# Patient Record
Sex: MALE | Race: WHITE | NOT HISPANIC OR LATINO | Employment: FULL TIME | URBAN - METROPOLITAN AREA
[De-identification: names, ages, dates, MRNs, and addresses within clinical notes are randomized per-mention and may not be internally consistent; named-entity substitution may affect disease eponyms.]

---

## 2017-04-24 ENCOUNTER — COMMUNICATION - HEALTHEAST (OUTPATIENT)
Dept: TELEHEALTH | Facility: CLINIC | Age: 22
End: 2017-04-24

## 2017-04-24 ENCOUNTER — OFFICE VISIT - HEALTHEAST (OUTPATIENT)
Dept: FAMILY MEDICINE | Facility: CLINIC | Age: 22
End: 2017-04-24

## 2017-04-24 DIAGNOSIS — F90.9 ADHD (ATTENTION DEFICIT HYPERACTIVITY DISORDER): ICD-10-CM

## 2017-04-24 ASSESSMENT — MIFFLIN-ST. JEOR: SCORE: 1914.78

## 2017-07-25 ENCOUNTER — COMMUNICATION - HEALTHEAST (OUTPATIENT)
Dept: FAMILY MEDICINE | Facility: CLINIC | Age: 22
End: 2017-07-25

## 2017-07-25 DIAGNOSIS — F90.9 ADHD (ATTENTION DEFICIT HYPERACTIVITY DISORDER): ICD-10-CM

## 2018-07-19 ENCOUNTER — OFFICE VISIT - HEALTHEAST (OUTPATIENT)
Dept: FAMILY MEDICINE | Facility: CLINIC | Age: 23
End: 2018-07-19

## 2018-07-19 ENCOUNTER — RECORDS - HEALTHEAST (OUTPATIENT)
Dept: GENERAL RADIOLOGY | Facility: CLINIC | Age: 23
End: 2018-07-19

## 2018-07-19 ENCOUNTER — COMMUNICATION - HEALTHEAST (OUTPATIENT)
Dept: TELEHEALTH | Facility: CLINIC | Age: 23
End: 2018-07-19
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
## 2018-07-19 ASSESSMENT — MIFFLIN-ST. JEOR: SCORE: 1946.54

## 2018-07-20 ENCOUNTER — HOSPITAL ENCOUNTER (OUTPATIENT)
Dept: CT IMAGING | Facility: CLINIC | Age: 23
Discharge: HOME | End: 2018-07-20
Attending: FAMILY MEDICINE
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
## 2018-09-19 ENCOUNTER — OFFICE VISIT - HEALTHEAST (OUTPATIENT)
Dept: FAMILY MEDICINE | Facility: CLINIC | Age: 23
End: 2018-09-19

## 2018-09-19 DIAGNOSIS — F90.9 ADHD (ATTENTION DEFICIT HYPERACTIVITY DISORDER): ICD-10-CM

## 2018-09-19 DIAGNOSIS — Z79.899 CONTROLLED SUBSTANCE AGREEMENT SIGNED: ICD-10-CM

## 2019-01-26 ENCOUNTER — RECORDS - HEALTHEAST (OUTPATIENT)
Dept: ADMINISTRATIVE | Facility: OTHER | Age: 24
End: 2019-01-26

## 2019-04-17 ENCOUNTER — OFFICE VISIT - HEALTHEAST (OUTPATIENT)
Dept: FAMILY MEDICINE | Facility: CLINIC | Age: 24
End: 2019-04-17

## 2019-04-17 DIAGNOSIS — J02.9 PHARYNGITIS, UNSPECIFIED ETIOLOGY: ICD-10-CM

## 2020-06-22 ENCOUNTER — RECORDS - HEALTHEAST (OUTPATIENT)
Dept: ADMINISTRATIVE | Facility: OTHER | Age: 25
End: 2020-06-22

## 2020-06-26 ENCOUNTER — RECORDS - HEALTHEAST (OUTPATIENT)
Dept: ADMINISTRATIVE | Facility: OTHER | Age: 25
End: 2020-06-26

## 2020-07-01 ENCOUNTER — RECORDS - HEALTHEAST (OUTPATIENT)
Dept: ADMINISTRATIVE | Facility: OTHER | Age: 25
End: 2020-07-01

## 2020-09-10 ENCOUNTER — COMMUNICATION - HEALTHEAST (OUTPATIENT)
Dept: SCHEDULING | Facility: CLINIC | Age: 25
End: 2020-09-10

## 2020-10-19 ENCOUNTER — RECORDS - HEALTHEAST (OUTPATIENT)
Dept: ADMINISTRATIVE | Facility: OTHER | Age: 25
End: 2020-10-19

## 2021-05-25 ENCOUNTER — RECORDS - HEALTHEAST (OUTPATIENT)
Dept: ADMINISTRATIVE | Facility: CLINIC | Age: 26
End: 2021-05-25

## 2021-05-30 VITALS — WEIGHT: 185 LBS | BODY MASS INDEX: 23 KG/M2 | HEIGHT: 75 IN

## 2021-06-01 VITALS — BODY MASS INDEX: 23.87 KG/M2 | HEIGHT: 75 IN | WEIGHT: 192 LBS

## 2021-06-01 VITALS — BODY MASS INDEX: 23.8 KG/M2 | WEIGHT: 190.4 LBS

## 2021-06-02 VITALS — WEIGHT: 192 LBS | BODY MASS INDEX: 24 KG/M2

## 2021-06-03 ENCOUNTER — RECORDS - HEALTHEAST (OUTPATIENT)
Dept: ADMINISTRATIVE | Facility: CLINIC | Age: 26
End: 2021-06-03

## 2021-06-10 NOTE — PROGRESS NOTES
"Gary Caldwell is a 21-year-old with a history of ADHD who presents today for follow-up.  He is overall doing well continues to use his Adderall daily.  He tells me the Vyvanse caused him more side effects and did not seem to last as long.  He is currently working at a landscaping job but is hoping to go to school once he is earned enough money.  We reviewed the pluses and minuses of his Adderall.  We discussed the idea of immunizations and he is not interested in HPV vaccine.    Objective:/62  Pulse 84  Resp 16  Ht 6' 3\" (1.905 m)  Wt 185 lb (83.9 kg)  BMI 23.12 kg/m2  Lungs are clear heart with a regular rate and rhythm without murmur he moves around the room well with only a minor sense of stiffness and soreness into his lower back.  His neurological examination is nonfocal    Assessment: ADHD    Plan: He is given a refill on his medication and will try for 90 day supply at that will make his life more convenient and he will follow-up here in 6 months or as needed    "

## 2021-06-11 NOTE — TELEPHONE ENCOUNTER
"Pt reports \"heart palpitations off and on for three days, every couple of minutes heart skips a beat\". \"More sporadic before but today pretty consistent\". Pulse 112 currently per pt. Pt denies chest pain, difficulty breathing or weakness. Pt does feel that activity increases the palpitations. See full assessment below.     Advised pt to be seen in clinic within four hours per protocol. Call back if symptoms worsen or new symptoms arise prior to being seen.    Pt verbalizes understanding and agrees to plan.       Additional Information    Negative: Passed out (i.e., lost consciousness, collapsed and was not responding)    Negative: Shock suspected (e.g., cold/pale/clammy skin, too weak to stand, low BP, rapid pulse)    Negative: Difficult to awaken or acting confused (e.g., disoriented, slurred speech)    Negative: Visible sweat on face or sweat dripping down face    Negative: Unable to walk, or can only walk with assistance (e.g., requires support)    Negative: [1] Received SHOCK from implantable cardiac defibrillator AND [2] persisting symptoms (i.e., palpitations, lightheadedness)    Negative: Sounds like a life-threatening emergency to the triager    Negative: Chest pain    Negative: Difficulty breathing    Negative: Dizziness, lightheadedness, or weakness    Negative: [1] Heart beating very rapidly (e.g., > 140 / minute) AND [2] present now  (Exception: during exercise)    Negative: Heart beating very slowly (e.g., < 50 / minute)  (Exception: athlete)    Negative: New or worsened shortness of breath with activity (dyspnea on exertion)    Negative: Patient sounds very sick or weak to the triager    Negative: [1] Heart beating very rapidly (e.g., > 140 / minute) AND [2] not present now  (Exception: during exercise)    [1] Skipped or extra beat(s) AND [2] increases with exercise or exertion    Protocols used: HEART RATE AND HEARTBEAT [id number]      "

## 2021-06-16 PROBLEM — Z79.899 CONTROLLED SUBSTANCE AGREEMENT SIGNED: Status: ACTIVE | Noted: 2018-09-19

## 2021-06-19 NOTE — PROGRESS NOTES
"    DATE OF SERVICE: 07/19/2018    SUBJECTIVE:  A 23-year-old male with a past medical history of injuries, and ADHD,  presents today with pain and swelling in the right flank for 1 month in duration of  mild intensity.  The pain is located in the right flank, associated with a \\"bulge,\\"  is moderately painful and it was associated with falling off of a skateboard.  When  fell he was not wearing a helmet, but he fell he did not strike his head and he did  not have loss of consciousness.  Review of old records show a 04/28/2016 patient  also had a hand injury due to punching a wall.  Review of records show he has had  multiple musculoskeletal injuries.      Socially he recently moved back to [place].  He smokes and was advised to quit.    REVIEW OF SYSTEMS:  Negative for loss of consciousness.  Negative for fever.    OBJECTIVE:    VITAL SIGNS:  Blood pressure is 110/72, pulse 90, respirations 20.    MUSCULOSKELETAL:  Examination of the right flank shows a 4 x 6 cm firm fluctuant  region in the right flank.  Examination of the hip shows no evidence of pain to  palpation over the greater trochanter, over the anterior superior iliac spine, over  the skin of the lateral aspect of the hip.  He is able to ambulate without  difficulty and there are no focal neurological deficits are noted.      Review of the x-rays of the right hip shows no evidence of fracture, displacement or  soft tissue swelling.    ASSESSMENT:  Hematoma.    PLAN:    1.  This most likely represents hematoma but cannot rule out mild infection.  It is  slightly erythematous and he is currently on amoxicillin for an upper respiratory  infection, so we will continue amoxicillin.  2.  X-rays were reviewed but will check CT for further evaluation and to see how  deep the lesion goes.  Would consider excision or drainage pending on the outcome.      MD maida LEONARD 07/19/2018 14:32:24  T 07/19/2018 15:13:35  R 07/19/2018 " 15:13:35  [id number]        cc:DAREN HENLEY MD

## 2021-06-20 NOTE — PROGRESS NOTES
Chief Complaint   Patient presents with     Medication Management     Adderall Med Check     HPI: Patient presents today for refills of his Adderall XR which she has been prescribed in the past.  He has been out of the state in [place] for the last 8 months and has been without his medication.  During that time he notes that he had significant issues with concentration and focusing.  He was initially diagnosed about a decade ago through interviews with his teachers and parents.  A recommendation was made for a child psychiatrist to consult with the patient but he does not believe that this ever happened.  When previously taken the medication he noticed increased ability to focus and get his schoolwork done.  He is currently starting classes to get his GED.  He works in [employer].    ROS:Review of Systems - History obtained from the patient  General ROS: negative  Psychological ROS: positive for - memory difficulties and difficulty focusing  Respiratory ROS: negative  Cardiovascular ROS: negative  Neurological ROS: negative  Dermatological ROS: negative    SH: The Patient's  reports that he has been smoking.  He has never used smokeless tobacco.      FH: The Patient's family history is not on file.     Meds:    Current Outpatient Prescriptions on File Prior to Visit   Medication Sig Dispense Refill     [DISCONTINUED] dextroamphetamine-amphetamine (ADDERALL XR) 20 MG 24 hr capsule Take 1 capsule (20 mg total) by mouth every morning. 90 capsule 0     No current facility-administered medications on file prior to visit.        O:  /78 (Patient Site: Left Arm, Patient Position: Sitting, Cuff Size: Adult Regular)  Pulse 78  Wt 190 lb 6.4 oz (86.4 kg)  BMI 23.8 kg/m2    Physical Examination:   General appearance - alert, well appearing, and in no distress  Mental status - alert, oriented to person, place, and time  Eyes - pupils equal and reactive, extraocular eye movements intact  Ears - bilateral TM's and  external ear canals normal  Nose - normal and patent, no erythema, discharge or polyps  Mouth - mucous membranes moist, pharynx normal without lesions  Neck - supple, no significant adenopathy  Lymphatics - no palpable lymphadenopathy, no hepatosplenomegaly  Chest - clear to auscultation, no wheezes, rales or rhonchi, symmetric air entry  Heart - normal rate and regular rhythm, S1 and S2 normal, no murmurs noted  Abdomen - soft, nontender, nondistended, no masses or organomegaly  Neurological - alert, oriented, normal speech, no focal findings or movement disorder noted, neck supple without rigidity, cranial nerves II through XII intact, motor and sensory grossly normal bilaterally, normal muscle tone, no tremors, strength 5/5  Musculoskeletal - no joint tenderness, deformity or swelling  Extremities - peripheral pulses normal, no pedal edema, no clubbing or cyanosis  Skin - normal coloration and turgor, no rashes, no suspicious skin lesions noted    A/P:     Problem List Items Addressed This Visit     ADHD (attention deficit hyperactivity disorder)    Relevant Medications    dextroamphetamine-amphetamine (ADDERALL XR) 20 MG 24 hr capsule    Controlled substance agreement signed        1. ADHD (attention deficit hyperactivity disorder)   reviewed.  Review of notes over the last 10 years were reviewed as well which demonstrate the patient has tried Concerta and Vyvanse eventually landing on Adderall.  His story lines up with the records in his chart.  Refill sent to the pharmacy.  The patient was advised to give us 5 days for refills  - dextroamphetamine-amphetamine (ADDERALL XR) 20 MG 24 hr capsule; Take 1 capsule (20 mg total) by mouth every morning.  Dispense: 30 capsule; Refill: 0    2. Controlled substance agreement signed  CSA signed and scanned into the chart.  Will get urine tox screen at next visit.    Follow up in 4 months.    Silas Lunsford, MATY    This transcription was created utilizing voice  recognition software and may contain typographical errors.

## 2023-03-05 ENCOUNTER — HOSPITAL ENCOUNTER (EMERGENCY)
Facility: CLINIC | Age: 28
Discharge: HOME | End: 2023-03-05
Attending: EMERGENCY MEDICINE | Admitting: EMERGENCY MEDICINE
Payer: COMMERCIAL

## 2023-03-05 VITALS
OXYGEN SATURATION: 97 % | BODY MASS INDEX: 27.59 KG/M2 | RESPIRATION RATE: 20 BRPM | SYSTOLIC BLOOD PRESSURE: 125 MMHG | DIASTOLIC BLOOD PRESSURE: 82 MMHG | HEART RATE: 85 BPM | WEIGHT: 215 LBS | TEMPERATURE: 97 F | HEIGHT: 74 IN

## 2023-03-05 DIAGNOSIS — S46.811A TRAPEZIUS STRAIN, RIGHT, INITIAL ENCOUNTER: ICD-10-CM

## 2023-03-05 PROCEDURE — 99284 EMERGENCY DEPT VISIT MOD MDM: CPT

## 2023-03-05 RX ORDER — CYCLOBENZAPRINE HCL 10 MG
10 TABLET ORAL 3 TIMES DAILY PRN
Qty: 20 TABLET | Refills: 0 | Status: SHIPPED | OUTPATIENT
Start: 2023-03-05 | End: 2023-03-11

## 2023-03-05 RX ORDER — OXYCODONE HYDROCHLORIDE 5 MG/1
5 TABLET ORAL EVERY 6 HOURS PRN
Qty: 12 TABLET | Refills: 0 | Status: SHIPPED | OUTPATIENT
Start: 2023-03-05 | End: 2023-03-08

## 2023-03-05 ASSESSMENT — ACTIVITIES OF DAILY LIVING (ADL): ADLS_ACUITY_SCORE: 33

## 2023-03-05 NOTE — Clinical Note
Gary Caldwell was seen and treated in our emergency department on 3/5/2023.  He may return to work on 03/13/2023.  No use of the right upper extremity until cleared by primary care physician or [facility].     If you have any questions or concerns, please don't hesitate to call.      Joo Lopez MD

## 2023-03-05 NOTE — Clinical Note
Gary Caldwell was seen and treated in our emergency department on 3/5/2023.  He may return to work on 03/13/2023.  No use of the right upper extremity until cleared by primary care physician or [facility].     If you have any questions or concerns, please don't hesitate to call.      Joo Lopez MD

## 2023-03-06 NOTE — ED TRIAGE NOTES
Pt was doing martial arts yesterday, an injured right arm. The pain is located in his right shoulder blade that is radiating to his neck, shoulder, and arm. 10/10 pain currently. Pt took tylenol today at 11 am.

## 2023-03-06 NOTE — ED PROVIDER NOTES
EMERGENCY DEPARTMENT ENCOUNTER      NAME: Gary Caldwell  AGE: 27 year old male  YOB: 1995  MRN: [id number]  EVALUATION DATE & TIME: 3/5/2023  8:30 PM    PCP: No Ref-Primary, Physician    ED PROVIDER: Joo Lopez M.D.      Chief Complaint   Patient presents with     Shoulder Pain         FINAL IMPRESSION:  No diagnosis found.      ED COURSE & MEDICAL DECISION MAKING:    Pertinent Labs & Imaging studies reviewed. (See chart for details)  27 year old male presents to the Emergency Department for evaluation of right shoulder pain/back pain.  This seems to be a trapezius tear.  He has no midline tenderness.  No focal deficits.  I do not think this is a spinal cord issue.  No signs of brachial plexus injury.  Discussed abortive care including Tylenol ibuprofen ice and heat.  We will give him oxycodone and Flexeril for symptomatic relief.  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  Will return for any worsening symptoms.  Patient discharged home.  Return for worsening symptoms    8:57 PM I met with the patient to gather history and to perform my initial exam. I discussed the plan for care while in the Emergency Department. PPE: Facemask, goggles and gloves     At the conclusion of the encounter I discussed the results of all of the tests and the disposition. The questions were answered. The patient or family acknowledged understanding and was agreeable with the care plan.     Medical Decision Making    History:    Supplemental history from: Documented in chart, if applicable    External Record(s) reviewed: Documented in chart, if applicable.    Work Up:    Chart documentation includes differential considered and any EKGs or imaging independently interpreted by provider, where specified.    In additional to work up documented, I considered the following work up: Documented in chart, if applicable.    External  "consultation:    Discussion of management with another provider: Documented in chart, if applicable    Complicating factors:    Care impacted by chronic illness: N/A    Care affected by social determinants of health: N/A    Disposition considerations: Discharge. I prescribed additional prescription strength medication(s) as charted. N/A.            MEDICATIONS GIVEN IN THE EMERGENCY:  Medications - No data to display    NEW PRESCRIPTIONS STARTED AT TODAY'S ER VISIT  New Prescriptions    No medications on file          =================================================================    HPI    Patient information was obtained from: Patient and Fiance.           Gary Caldwell is a 27 year old right handed male with who presents to this ED  for evaluation of right shoulder pain.  Patient was doing jujitsu yesterday when he was put in a submission hold.  His right arm was hyperextended.  He has having pain over his right back just superior to his scapula.  This goes up into his neck.  No numbness or tingling in his arm.  Pain is worse with range of motion of the shoulder.  No shortness of breath.  No headache.  He did not get knocked out.        PAST MEDICAL HISTORY:  No past medical history on file.    PAST SURGICAL HISTORY:  No past surgical history on file.        CURRENT MEDICATIONS:    No current facility-administered medications for this encounter.     No current outpatient medications on file.         ALLERGIES:  No Known Allergies    FAMILY HISTORY:  No family history on file.    SOCIAL HISTORY:   Social History     Socioeconomic History     Marital status: Single   Tobacco Use     Smoking status: Every Day     Smokeless tobacco: Never   Social History Narrative    The patient lives at home with his family.        VITALS:  BP (!) 176/100   Pulse 85   Temp 97  F (36.1  C) (Temporal)   Resp 20   Ht 1.88 m (6' 2\")   Wt 97.5 kg (215 lb)   SpO2 97%   BMI 27.60 kg/m      PHYSICAL EXAM    Physical Exam  Vitals " and nursing note reviewed.   Constitutional:       General: He is not in acute distress.     Appearance: He is not diaphoretic.   HENT:      Head: Atraumatic.   Eyes:      General: No scleral icterus.     Pupils: Pupils are equal, round, and reactive to light.   Cardiovascular:      Rate and Rhythm: Normal rate and regular rhythm.      Heart sounds: Normal heart sounds.   Pulmonary:      Effort: No respiratory distress.      Breath sounds: Normal breath sounds.   Abdominal:      Palpations: Abdomen is soft.      Tenderness: There is no abdominal tenderness.   Musculoskeletal:         General: Tenderness present.      Comments: Tenderness over the trapezius muscle of the right.  This tenderness goes from the right side of his neck down to his right scapula.  Pain is worse with range of motion of his shoulder.  No midline cervical pain.  No numbness or tingling into his arm with axial loading of his neck   Lymphadenopathy:      Cervical: No cervical adenopathy.   Skin:     General: Skin is warm.      Findings: No rash.   Neurological:      Comments: 5 of 5 strength in bilateral upper extremities.  Sensation intact all 4 extremities.           LAB:  All pertinent labs reviewed and interpreted.  Labs Ordered and Resulted from Time of ED Arrival to Time of ED Departure - No data to display    RADIOLOGY:  Reviewed all pertinent imaging. Please see official radiology report.  No orders to display         Joo Lopez M.D.  Emergency Medicine  [facility] EMERGENCY ROOM  [address] [facility] [address] [phone]  Dept: [phone]     Joo Lopez MD  03/05/23 [id number]

## 2023-03-06 NOTE — ED NOTES
Besides limited ROM, CMS intact to RUE. Pt agreeable with discharge; able to verbalize a home pain mgmt plan.

## 2023-05-08 ENCOUNTER — HEALTH MAINTENANCE LETTER (OUTPATIENT)
Age: 28
End: 2023-05-08

## 2023-10-06 NOTE — PROGRESS NOTES
Chief Complaint   Patient presents with     Sore Throat     Pt c/o sore throat, chest hurts, congestion, lots of mucous, HA x 2 days       HPI: Patient presents with 2-day history of cough congestion sore throat and fever.  This is in conjunction with producing phlegm and coughing up phlegm.  Socially he works as a  and is putting in long hours.  He continues to smoke was advised to quit.    ROS: No vomiting or diarrhea or rashes    SH:    reports that he has been smoking.  He has never used smokeless tobacco.      FH: The Patient's family history is not on file.     Meds:  Gary has a current medication list which includes the following prescription(s): penicillin vk.    O:  /60   Pulse 94   Temp 98.4  F (36.9  C)   Resp 16   Wt 192 lb (87.1 kg)   SpO2 98%   BMI 24.00 kg/m     Constitutional:    --Vitals as above  --No acute distress  Eyes-  --Sclera noninjected  --Lids and conjunctiva normal  ENT-  --TMs clear  --Sclera noninjected  --Pharynx markedly erythematous and swollen  Neck-  --Neck supple    Lymph-  --painful moderate cervical lymphadenopathy  Lungs-  --Clear to Auscultation  Heart-  --Regular rate and rhythm  Skin-  --Pink and dry          A/P:   1. Pharyngitis, unspecified etiology  - penicillin VK (PEN VK) 500 MG tablet; Take 1 tablet (500 mg total) by mouth 4 (four) times a day for 10 days.  Dispense: 40 tablet; Refill: 0    2.  Smoking  -Encourage cessation    3.  Fever  -Ibuprofen for fever and pain                                       ,

## 2024-07-14 ENCOUNTER — HEALTH MAINTENANCE LETTER (OUTPATIENT)
Age: 29
End: 2024-07-14

## 2024-09-03 ENCOUNTER — LAB (OUTPATIENT)
Dept: LAB | Facility: CLINIC | Age: 29
End: 2024-09-03

## 2024-09-03 DIAGNOSIS — Z31.440 ENCOUNTER OF MALE FOR TESTING FOR GENETIC DISEASE CARRIER STATUS FOR PROCREATIVE MANAGEMENT: Primary | ICD-10-CM

## 2024-09-03 DIAGNOSIS — Z31.440 ENCOUNTER OF MALE FOR TESTING FOR GENETIC DISEASE CARRIER STATUS FOR PROCREATIVE MANAGEMENT: ICD-10-CM

## 2024-09-03 DIAGNOSIS — Z84.81 FAMILY HISTORY OF GENETIC DISEASE CARRIER: ICD-10-CM

## 2024-09-03 PROCEDURE — 36415 COLL VENOUS BLD VENIPUNCTURE: CPT

## 2024-09-03 NOTE — PROGRESS NOTES
[facility]  Genetic Counseling Consult    Patient:  Gary Caldwell YOB: 1995   Date of Service:  24      Gary was seen at the [facility] [place] for genetic consultation to discuss the option of carrier screening.         IMPRESSION/PLAN:   1. Gary elected to pursue expanded carrier screening as part of his partner, Polina's ongoing pregnancy management. Benefits and limitations of testing were discussed today. A sample was drawn today and sent [facility]. Results are expected within 2-3 weeks. We will contact him to discuss the results. Gary requested a vague voicemail with callback number if we reach his voicemail. Authorization to share protected health information was signed today for us to discuss results with his partner, Polina.     CARRIER SCREENING:   Expanded carrier screening is available to screen for autosomal recessive conditions and X-linked conditions in a large list of genes. Carrier screening does not test the pregnancy but gives a risk assessment for the pregnancy and future pregnancies to have the condition. Expanded carrier screening is designed to identify carrier status for conditions that are primarily childhood or adolescent onset. Expanded carrier screening does not evaluate for adult-onset conditions such as hereditary cancer syndromes, dementia/ Alzheimer's disease, or cardiovascular disease risk factors. Additionally, expanded carrier screening is not comprehensive for all known genetic diseases or inherited conditions. Carrier screening does not test for all genetic and health conditions or risk factors. It does not intentionally screen for autosomal dominant conditions. [place] screening was not reviewed due to focus on other topics.  About [place] Screening    Autosomal recessive conditions happen when a mutation has been inherited from the egg and sperm and include conditions like  "cystic fibrosis, thalassemia, hearing loss, spinal muscular atrophy, and more. We reviewed that when both biological parents carry a harmful genetic change in a gene associated with autosomal recessive inheritance, each of their pregnancies has a 1 in 4 (25%) chance to be affected by that condition. X-linked conditions happen when a mutation has been inherited from the egg and include conditions like fragile X syndrome.With x-linked conditions, the specific risk generally depends on the chromosomal sex of the fetus, with XY individuals (generally male) being most severely affected.     The patient does NOT have a family history of known inherited conditions. This does NOT mean the patient and/or their partner is not a carrier of a condition. Approximately 90% of couples at an increased reproductive risk for an inherited condition have no family history of that condition.     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      Nemaline myopathy is a condition that causes muscle weakness in the face, neck, trunk, upper arms, and upper legs. This can cause difficulty with walking, speaking, swallowing, or breathing. Mutations in the NEB gene are typically associated with \"typical\" or \"typical congenital\" nemaline myopathy. Individuals with this form of the condition are typically born with muscle weakness and may experience motor delays but most will eventually walk independently. Treatment is focused on symptoms and may include physical therapy, mobility devices, and speech therapy. Prognosis is good for most individuals. However, more rarely some individuals have a more severe form of this condition and may have a shortened lifespan, typically due to respiratory complications, and joint contractures at birth.      We reviewed that nemaline myopathy is inherited in an autosomal " recessive manner. Every individual has two copies of the gene that is responsible for this condition, and if someone has a change or mutation that impacts how one copy of the gene functions, they are called a carrier for the condition.  If someone has two copies of the gene that have a harmful change, they are affected with the condition.  If two people who are carriers for the same condition have children, there is a chance for their children to be affected.  Each parent has a 50% chance for passing on their copy of the gene with a mutation, so there is a 25% chance for each pregnancy to get two copies of the mutation and be affected, a 50% chance for each pregnancy to be an unaffected carrier, and a 25% chance to be unaffected with two normal copies of the gene.      Discussed that the current reproductive risk is approximately 1 in 350 based on a 1/87 general population carrier frequency for this condition. Carrier screening for Gary is available to further clarify risk in the current pregnancy.      We reviewed that the only method to determine the outcome or diagnose the condition in a pregnancy is an invasive testing option such as an amniocentesis. Other couples will choose to test for the condition after delivery.     Gary opted to proceed with carrier screening today. We discussed the option of proceeding with testing through [facility] for either just the NEB gene or for the entire panel Polina was screened for (267 conditions) and Gary opted to proceed with the expanded 267 gene panel today.      Reviewed the following with Gary:      If both parents are carriers of an autosomal recessive condition, there are three possible outcomes for each pregnancy/child: 25% unaffected, 50% carrier, and 25% affected. The only method to determine the outcome or diagnose the condition in a pregnancy is an invasive testing option such as an amniocentesis. Some genetic conditions will have ultrasound findings during  the pregnancy but many do not. Other couples will choose to test for the condition after delivery. While these conditions cannot be cured or treated during the pregnancy it may guide management recommendations (ultrasounds) for pregnancy as well as delivery and infant care.   Carrier screening is not meant to diagnose the patient with a condition, and generally carriers are asymptomatic. However, certain genes may confer increased risks for various health concerns in carriers (including, but not limited to: JOHNATHAN, DMD, FMR1).  We discussed that carrier screening can have implications for other family members. If an individual is a carrier of a condition there is a chance for relatives to also be a carrier. This may be helpful information to disclose to family members of reproductive age (e.g. siblings, cousins) so they may choose if they want to pursue carrier screening. In addition, if only one parent is found to be a carrier, there is a 50% chance for each child to be a carrier. This may be helpful information for the patient's children when they start a family.  We reviewed that there is a law in place, the Genetic Information Nondiscrimination Act (JOSE), that protects patients from discrimination by health insurance companies and employers based on their genetic information. JOSE does not protect against discrimination by life insurance companies or disability insurance.  We reviewed that carrier screening will report on variants classified by the lab as pathogenic or likely pathogenic. Although carrier status does not change over time, it is possible that a variant could be reclassified as more information about the variant is learned. If this occurs, the couple will be contacted and a new risk assessment will be provided.   We discussed that [employer] will generate a cost estimate and contact the patient with their expected out-of-pocket cost. If the estimated out-of-pocket cost is estimated to exceed $249, a  patient-pay option of $249 is available. The patient must select this option in the [employer] portal within a specific window after receiving their cost estimate. It is the patient's responsibility to determine whether insurance billing or patient pay is a better financial decision and to follow up with [employer] to make the selection for patient pay if desired. The patient was also provided with a [employer] billing card and is encouraged to contact [employer]'s billing office directly if they have additional questions or concerns regarding billing. The patient expressed her understanding.    It was a pleasure to be involved with Gary's care.    Angela Diallo, [facility], [facility]  Licensed Genetic Counselor  [facility]  Maternal Fetal Medicine  Phone: [phone]  [email or web address]

## 2024-09-25 LAB — SCANNED LAB RESULT: NORMAL

## 2024-09-26 ENCOUNTER — TELEPHONE (OUTPATIENT)
Dept: MATERNAL FETAL MEDICINE | Facility: CLINIC | Age: 29
End: 2024-09-26

## 2024-09-26 NOTE — TELEPHONE ENCOUNTER
September 26, 2024    I left a voicemail for Gary letting him know that his carrier screen results are back and available for review. No details were left in his voicemail per his request at time of visit. Encouraged him to call me back at his earliest convenience to review his results. My direct office number was provided.     Angela Diallo [facility], [facility]  Licensed Genetic Counselor  [facility]  Maternal Fetal Medicine  Phone: [phone]  [email or web address]

## 2024-09-27 NOTE — TELEPHONE ENCOUNTER
September 27, 2024    Left a second voicemail for Gary asking him to return my call to review his carrier screen results. My direct office number was provided.     Angela Diallo [facility], [facility]  Licensed Genetic Counselor  [facility]  Maternal Fetal Medicine  Phone: [phone]  [email or web address]

## 2024-10-01 NOTE — TELEPHONE ENCOUNTER
October 1, 2024    I left a third voicemail for Gary letting him know his carrier screen results are available for review. I will send him a [employer] message summarizing these results since I have been unable to reach him. Encouraged him to call me back with any additional questions or concerns.     Angela Diallo, [facility], [facility]  Licensed Genetic Counselor  [facility]  Maternal Fetal Medicine  Phone: [phone]  [email or web address]

## 2025-03-22 ENCOUNTER — OFFICE VISIT (OUTPATIENT)
Dept: URGENT CARE | Facility: URGENT CARE | Age: 30
End: 2025-03-22
Payer: COMMERCIAL

## 2025-03-22 VITALS
WEIGHT: 245 LBS | SYSTOLIC BLOOD PRESSURE: 131 MMHG | OXYGEN SATURATION: 98 % | HEIGHT: 75 IN | DIASTOLIC BLOOD PRESSURE: 89 MMHG | BODY MASS INDEX: 30.46 KG/M2 | TEMPERATURE: 98.2 F | RESPIRATION RATE: 16 BRPM | HEART RATE: 82 BPM

## 2025-03-22 DIAGNOSIS — S46.812A TRAPEZIUS STRAIN, LEFT, INITIAL ENCOUNTER: Primary | ICD-10-CM

## 2025-03-22 PROCEDURE — 99203 OFFICE O/P NEW LOW 30 MIN: CPT | Performed by: PHYSICIAN ASSISTANT

## 2025-03-22 RX ORDER — ROSUVASTATIN CALCIUM 40 MG/1
40 TABLET, COATED ORAL DAILY
COMMUNITY

## 2025-03-22 RX ORDER — CYCLOBENZAPRINE HCL 10 MG
10 TABLET ORAL 3 TIMES DAILY PRN
Qty: 15 TABLET | Refills: 0 | Status: SHIPPED | OUTPATIENT
Start: 2025-03-22

## 2025-03-22 ASSESSMENT — ENCOUNTER SYMPTOMS
NECK PAIN: 1
WOUND: 0
FEVER: 0
COLOR CHANGE: 0
NUMBNESS: 0

## 2025-03-22 ASSESSMENT — PAIN SCALES - GENERAL: PAINLEVEL_OUTOF10: SEVERE PAIN (9)

## 2025-03-22 NOTE — PATIENT INSTRUCTIONS
Continue to use ice.     Work on light stretches several times a day.     Take Naproxen or ibuprofen and take Tylenol also.     Use muscle relaxer as needed this does cause drowsiness.

## 2025-03-22 NOTE — PROGRESS NOTES
Assessment & Plan:        ICD-10-CM    1. Trapezius strain, left, initial encounter  S46.812A cyclobenzaprine (FLEXERIL) 10 MG tablet            Plan/Clinical Decision Making:    Patient presents with acute left trapezius pain. No specific injury. Hard to move neck. On exam tenderness left posterior back and shoulder. Decreased ROM. Discussed treatment.   Can use muscle relaxer for pain, reviewed sedation with medication.     Patient Instructions   Continue to use ice.     Work on light stretches several times a day.     Take Naproxen or ibuprofen and take Tylenol also.     Use muscle relaxer as needed this does cause drowsiness.         Return if symptoms worsen or fail to improve, for in 5-7 days.     At the end of the encounter, I discussed results, diagnosis, medications. Discussed red flags for immediate return to clinic/ER, as well as indications for follow up if no improvement. Patient understood and agreed to plan. Patient was stable for discharge.        Aubrie Rooney PA-C on 3/22/2025 at 3:37 PM          Subjective:     HPI:    Gary is a 29 year old male who presents to clinic today for the following health issues:  Chief Complaint   Patient presents with    Urgent Care     Neck and shoulder pain x 1 day , has had same injury happen a couple of times. Unable to move or lay down. States he is not sleeping well , has a new baby at home. Sore from work, reached up to stretch and felt something pop. Ice and Advil not helping .      HPI    Last night was stretching back/neck and felt bad pain. Radiating pain in left arm.   Took Advil right away.   Use ice and heat and not helping. Rested and not improving.   Work: job supervisor. Two days ago did do increased work/lifting and causes some soreness, no acute injury or trauma.     Had this in the past. Review of chart shows seen in ED in 2023 and treated with Flexeril and oxycodone.     Review of Systems   Constitutional:  Negative for fever.  "  Musculoskeletal:  Positive for neck pain.   Skin:  Negative for color change and wound.   Neurological:  Negative for numbness.         Patient Active Problem List   Diagnosis    Joint Pain In The Toes    Acute Pharyngitis    Influenza Type B    Finger Injury    Joint Pain, Localized In The Knee    ADHD (attention deficit hyperactivity disorder)    Controlled substance agreement signed        History reviewed. No pertinent past medical history.    Social History     Tobacco Use    Smoking status: Every Day    Smokeless tobacco: Never   Substance Use Topics    Alcohol use: Not on file             Objective:     Vitals:    03/22/25 1513   BP: 131/89   Pulse: 82   Resp: 16   Temp: 98.2  F (36.8  C)   TempSrc: Oral   SpO2: 98%   Weight: 111.1 kg (245 lb)   Height: 1.905 m (6' 3\")         Physical Exam  Constitutional:       Appearance: Normal appearance.   Musculoskeletal:      Cervical back: Tenderness (left side) present. No swelling, edema, deformity, erythema, signs of trauma or bony tenderness. Pain with movement present. Decreased range of motion.        Back:    Lymphadenopathy:      Cervical: No cervical adenopathy.   Neurological:      Mental Status: He is alert.      Cranial Nerves: Cranial nerves 2-12 are intact.      Sensory: Sensation is intact.      Motor: No weakness.            Results:  No results found for any visits on 03/22/25.    "

## 2025-07-19 ENCOUNTER — HEALTH MAINTENANCE LETTER (OUTPATIENT)
Age: 30
End: 2025-07-19